# Patient Record
Sex: FEMALE | Race: WHITE | Employment: OTHER | URBAN - METROPOLITAN AREA
[De-identification: names, ages, dates, MRNs, and addresses within clinical notes are randomized per-mention and may not be internally consistent; named-entity substitution may affect disease eponyms.]

---

## 2018-12-10 PROBLEM — R91.8 PULMONARY NODULES: Status: ACTIVE | Noted: 2018-12-10

## 2022-12-20 ENCOUNTER — TELEPHONE (OUTPATIENT)
Dept: HEMATOLOGY/ONCOLOGY | Facility: HOSPITAL | Age: 72
End: 2022-12-20

## 2022-12-28 ENCOUNTER — OFFICE VISIT (OUTPATIENT)
Dept: HEMATOLOGY/ONCOLOGY | Facility: HOSPITAL | Age: 72
End: 2022-12-28
Attending: STUDENT IN AN ORGANIZED HEALTH CARE EDUCATION/TRAINING PROGRAM
Payer: MEDICARE

## 2022-12-28 VITALS
RESPIRATION RATE: 20 BRPM | DIASTOLIC BLOOD PRESSURE: 67 MMHG | SYSTOLIC BLOOD PRESSURE: 137 MMHG | BODY MASS INDEX: 27.54 KG/M2 | WEIGHT: 131.19 LBS | OXYGEN SATURATION: 98 % | TEMPERATURE: 99 F | HEIGHT: 58 IN | HEART RATE: 77 BPM

## 2022-12-28 DIAGNOSIS — Z85.118 HX OF CANCER OF LUNG: Primary | ICD-10-CM

## 2022-12-28 DIAGNOSIS — C34.92 ADENOCARCINOMA OF LEFT LUNG (HCC): ICD-10-CM

## 2022-12-28 PROCEDURE — 99204 OFFICE O/P NEW MOD 45 MIN: CPT | Performed by: STUDENT IN AN ORGANIZED HEALTH CARE EDUCATION/TRAINING PROGRAM

## 2022-12-28 RX ORDER — RIVAROXABAN 20 MG/1
TABLET, FILM COATED ORAL
COMMUNITY
Start: 2022-10-10

## 2022-12-28 RX ORDER — DILTIAZEM HYDROCHLORIDE 300 MG/1
CAPSULE, COATED, EXTENDED RELEASE ORAL
COMMUNITY
Start: 2022-12-02

## 2022-12-28 RX ORDER — LISINOPRIL AND HYDROCHLOROTHIAZIDE 12.5; 1 MG/1; MG/1
TABLET ORAL
COMMUNITY
Start: 2022-12-02

## 2022-12-28 RX ORDER — ATORVASTATIN CALCIUM 10 MG/1
10 TABLET, FILM COATED ORAL DAILY
COMMUNITY
Start: 2022-07-29

## 2023-01-04 ENCOUNTER — HOSPITAL ENCOUNTER (OUTPATIENT)
Dept: CT IMAGING | Facility: HOSPITAL | Age: 73
Discharge: HOME | End: 2023-01-04
Attending: STUDENT IN AN ORGANIZED HEALTH CARE EDUCATION/TRAINING PROGRAM
Payer: MEDICARE

## 2023-01-04 DIAGNOSIS — C34.92 ADENOCARCINOMA OF LEFT LUNG (HCC): ICD-10-CM

## 2023-01-04 DIAGNOSIS — Z85.118 HX OF CANCER OF LUNG: ICD-10-CM

## 2023-01-04 LAB
CREAT BLD-MCNC: 1 MG/DL
GFR SERPLBLD BASED ON 1.73 SQ M-ARVRAT: 61 ML/MIN/1.73M2 (ref 60–?)

## 2023-01-04 PROCEDURE — 71260 CT THORAX DX C+: CPT | Performed by: STUDENT IN AN ORGANIZED HEALTH CARE EDUCATION/TRAINING PROGRAM

## 2023-01-04 PROCEDURE — 82565 ASSAY OF CREATININE: CPT

## 2023-01-06 ENCOUNTER — TELEPHONE (OUTPATIENT)
Dept: HEMATOLOGY/ONCOLOGY | Facility: HOSPITAL | Age: 73
End: 2023-01-06

## 2023-01-06 DIAGNOSIS — C34.92 ADENOCARCINOMA OF LEFT LUNG (HCC): Primary | ICD-10-CM

## 2023-01-06 NOTE — TELEPHONE ENCOUNTER
Called Arcadio Hodge to review CT chest findings. All reviewed in detail. Repeat CT chest in 3mo - orders placed. Pt to call to sched repeat CT chest first week of April 2023. She will need a fu appt w/ Dr. Norma Godfrey 2nd week of April 2023. Will have sched team call pt. She is in agreement and appreciative of phone call. All ques answered appropriately.

## 2023-01-13 ENCOUNTER — HOSPITAL ENCOUNTER (OUTPATIENT)
Dept: MAMMOGRAPHY | Facility: HOSPITAL | Age: 73
Discharge: HOME | End: 2023-01-13
Attending: INTERNAL MEDICINE
Payer: MEDICARE

## 2023-01-13 DIAGNOSIS — Z12.31 ENCOUNTER FOR SCREENING MAMMOGRAM FOR MALIGNANT NEOPLASM OF BREAST: ICD-10-CM

## 2023-01-13 PROCEDURE — 77063 BREAST TOMOSYNTHESIS BI: CPT | Performed by: INTERNAL MEDICINE

## 2023-01-13 PROCEDURE — 77067 SCR MAMMO BI INCL CAD: CPT | Performed by: INTERNAL MEDICINE

## 2023-04-04 NOTE — TELEPHONE ENCOUNTER
Lisa Wendiemaeve [phone] would like for someone to call and give her the results of her CT from 3/30/2023.  Thanks [employer]

## 2023-04-04 NOTE — TELEPHONE ENCOUNTER
Writer called patient back and had appointment switched to virtual visit at 11:30am tomorrow. Patient was unable to come for in person appointment as she is preparing for travel/flight on Thursday 4/6. Courtney Craig agreeable and thanked writer for calling.

## 2023-04-05 NOTE — PROGRESS NOTES
Virtual Telephone Check-In    Chris Canas verbally consents to a Virtual/Telephone Check-In visit on 04/05/23. Patient has been referred to the [facility] website at [email or web address] to review the yearly Consent to Treat document. Patient understands and accepts financial responsibility for any deductible, co-insurance and/or co-pays associated with this service. Duration of the service: 4 minutes      Summary of topics discussed:   Pt is doing well. No clinical concerns. Imaging stable. Will plan for surveillance CT and followup in 6 months, pt instructed to call to schedule CT and followup is scheduled in October 2023. She knows to call with any concerns in the interim.      Jair Sawyer, [address] [facility]

## 2024-09-18 NOTE — TELEPHONE ENCOUNTER
Patient is calling to schedule follow up to discuss Ct results. Patient states she is going out of town on 10/8. Patient would like to see Dr. Ashby before then and is requesting a video visit. Called 9/18/24 [place]

## 2024-10-07 NOTE — PROGRESS NOTES
[facility]  Oncology Clinic Progress Note    Patient Name: Cayla Medina   YOB: 1950   Medical Record Number: [id number]  Referring Physician(s): No ref. provider found    Reason for visit: hx of lung cancer    Subjective:   Cayla Medina is a 74 year old female with a hx of pAFib on xarelto, HTN, HLD and recurrent lung cancer that presents for medical oncology follow-up regarding a history of lung cancer.    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  Surveillance imaging revealed a new left lower lobe nodule concerning for malignancy.  Biopsy 12/2018 revealed adenocarcinoma and she underwent SBRT to the lesion in January 2019 and has remained on active surveillance since that time.      Clinically at this time, she continues to do well without any acute concerns or complaints.  She has had some difficulties with her health insurance and because of this lost her recent primary care provider but has reestablished.    Review of Systems:  Hematology/Oncology ROS performed and negative except as above in HPI    History/Other:   Current Medications:   XARELTO 20 MG Oral Tab       atorvastatin 10 MG Oral Tab Take 1 tablet (10 mg total) by mouth daily.      dilTIAZem  MG Oral Capsule SR 24 Hr       lisinopril-hydroCHLOROthiazide 10-12.5 MG Oral Tab        Allergies:   No Known Allergies    Objective:   Blood pressure 128/58, pulse 93, temperature 97.9 °F (36.6 °C), temperature source Oral, resp. rate 16, height 1.473 m (4' 10\"), weight 53.5 kg (118 lb), SpO2 99%.  Physical Exam:  ECOG PS: 0  General: A&Ox3, NAD  HEENT:  Anicteric, OP clear  Neck:  Supple, nt, no LAD, normal AROM  CV: RRR, no murmurs  Pulm: CTA b/l, no w/r/r, normal effort  Abd: soft, ntnd, normal bowel sounds, no HSM or  masses  Extremities: no edema  Neurological: grossly intact    Results:   Labs:  No results found for: \"WBC\", \"HGB\", \"HCT\", \"PLT\", \"CREATSERUM\", \"BUN\", \"NA\", \"K\", \"CL\", \"CO2\", \"GLU\", \"CA\", \"ALB\", \"ALKPHO\", \"BILT\", \"TP\", \"AST\", \"ALT\", \"PTT\", \"INR\", \"PT\", \"T4F\", \"TSH\", \"TSHREFLEX\", \"NADINE\", \"LIP\", \"GGT\", \"PSA\", \"DDIMER\", \"ESRML\", \"ESRPF\", \"CRP\", \"BNP\", \"MG\", \"PHOS\", \"TROP\", \"CK\", \"CKMB\", \"FAHAD\", \"RPR\", \"B12\", \"ETOH\", \"POCGLU\"    Imaging:  CT chest w/o contrast 10/19/21:  1. Overall stable examination. Right lower lobe groundglass nodules are   stable. Consider follow-up in one year.   2. Postoperative changes in the right lung as above without evidence of   recurrent nodule or mass.  3. Presumed postradiation changes in the left lower lobe, stable.   4. Other findings as above.     Assessment & Plan:   Cayla Medina is a 74 year old female with a hx of pAFib on xarelto, HTN, HLD and recurrent lung cancer that presents for medical oncology followup regarding early stage lung cancer. 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  Surveillance imaging revealed a new left lower lobe nodule concerning for malignancy.  Biopsy 12/2018 revealed adenocarcinoma and she underwent SBRT to the lesion in January 2019 and has remained on active surveillance since that time.     # Stage IIB adenocarcinoma of the right upper lobe s/p lobectomy and adjuvant chemotherapy; early stage left lower lobe pulmonary adenocarcinoma EGFR neg, ALK neg, ROS neg, PD-L1 10% at least in situ diagnosed 12/2018 S/P SBRT 01/2019 and additional GGOs, lingular nodule-no activity per PET/CT   -imaging this visit remains stable, followup with CT chest without contrast in 1 year or sooner prn    # Comorbidities.   -Afib, xarelto, diltiazem  -HTN, HLD, home meds    Erickson Ashby,  DO    [facility]  [facility]  This note was created using a voice-recognition transcribing system. Incorrect words or phrases may have been missed during proofreading. Please interpret accordingly.

## 2025-06-14 NOTE — PROCEDURES
The office order for PCP removal request is Approved and finalized on June 14, 2025.    Removed Radha Cardenas MD as the patient's Primary Care Physician